# Patient Record
Sex: MALE | Race: WHITE | NOT HISPANIC OR LATINO | URBAN - METROPOLITAN AREA
[De-identification: names, ages, dates, MRNs, and addresses within clinical notes are randomized per-mention and may not be internally consistent; named-entity substitution may affect disease eponyms.]

---

## 2023-04-13 ENCOUNTER — OFFICE VISIT (OUTPATIENT)
Dept: URBAN - METROPOLITAN AREA CLINIC 63 | Facility: CLINIC | Age: 38
End: 2023-04-13
Payer: COMMERCIAL

## 2023-04-13 ENCOUNTER — LAB OUTSIDE AN ENCOUNTER (OUTPATIENT)
Dept: URBAN - METROPOLITAN AREA CLINIC 63 | Facility: CLINIC | Age: 38
End: 2023-04-13

## 2023-04-13 ENCOUNTER — WEB ENCOUNTER (OUTPATIENT)
Dept: URBAN - METROPOLITAN AREA CLINIC 63 | Facility: CLINIC | Age: 38
End: 2023-04-13

## 2023-04-13 VITALS
DIASTOLIC BLOOD PRESSURE: 70 MMHG | TEMPERATURE: 98.1 F | SYSTOLIC BLOOD PRESSURE: 110 MMHG | HEIGHT: 73 IN | OXYGEN SATURATION: 99 % | HEART RATE: 71 BPM | BODY MASS INDEX: 20.89 KG/M2 | WEIGHT: 157.6 LBS

## 2023-04-13 DIAGNOSIS — R68.81 EARLY SATIETY: ICD-10-CM

## 2023-04-13 DIAGNOSIS — R10.13 DYSPEPSIA: ICD-10-CM

## 2023-04-13 DIAGNOSIS — R19.5 DARK STOOLS: ICD-10-CM

## 2023-04-13 PROCEDURE — 99204 OFFICE O/P NEW MOD 45 MIN: CPT | Performed by: PHYSICIAN ASSISTANT

## 2023-04-13 RX ORDER — RIMEGEPANT SULFATE 75 MG/75MG
TABLET, ORALLY DISINTEGRATING ORAL
Qty: 16 EACH | Status: ACTIVE | COMMUNITY

## 2023-04-13 RX ORDER — PROPRANOLOL HYDROCHLORIDE 20 MG/1
TABLET ORAL
Qty: 180 TABLET | Status: ACTIVE | COMMUNITY

## 2023-04-13 NOTE — HPI-TODAY'S VISIT:
37-year-old male presents to the office for follow-up after having been seen in the ER on March 31, 2023.  He complained of abdominal pain which had been going on since November 2022.  He also complained of a visual disturbance and mild right-sided headache which lasted for about 30 minutes and resolved when he arrived at the emergency department.  He reported a history of migraine headaches.  He uses Goody's powder to manage his headaches but limited its use due to his stomach issues and abdominal pain.  Since November, he described experiencing crampy migratory abdominal pain which would be sharp and severe at times.  His CBC, CMP, and lipase were unremarkable.  CT scan of the abdomen and pelvis was done with contrast which demonstrated no acute intra-abdominal process.  Mild fatty infiltration of the liver was noted.  Noncontrast CT scan of the head showed no acute abnormality.    He followed up with neurology on April 6, 2023.  His visual disturbance and headache was thought to be secondary to migraine headache with aura an MRI of the brain was ordered.  He was started on Inderal and Nurtec.  He was advised to cut down on Goody powder which she was using 3-4 times per week.  He reports that in Nov, he began to have early satiety. Usually he is hungry every 2-3 hours but then started feeilng full quickly. He had some recent constipation but had no change in his diet or lifestyle. He started taking a probiotic but his consitpation worsened. He usually has 1-2 BMs per day but then about 2 months ago, he was passing only a few small, hard pellets per day. He was taking Goody's powder once on most days, sometimes twice per day for about 2 years. He decided to stop taking this to see if his Gi symptoms improved. He ended up taking a packet of Goody's powder and then he had a BM that was "on petr side of brown." He stopped taking the goody powder and then the color of his stool returned to normal. He was having dyspepsia occasional on Goody's powder and his dyspepsia resolved.   He has been feeing fine from a GI standpoint since he stopped taking Goody's powder thought his appetite is still low and he has early satiety. No nausea or vomiting. He reports an unintentional weight loss of 10 pounds over the past couple of months. As he goes on talking, he reports intermittent tightness in the epigastrium.  No priop EGD.   He had a colonsocopy in 2018 with [facility]  to evaluate rectal bleeding and he was told this was due to hemorrhoids. His colonoscopy was otherwise normal.  Maternal grandfather had colon cancer. One second cousin had stomach cancer.

## 2023-05-16 ENCOUNTER — OFFICE VISIT (OUTPATIENT)
Dept: URBAN - METROPOLITAN AREA SURGERY CENTER 4 | Facility: SURGERY CENTER | Age: 38
End: 2023-05-16
Payer: COMMERCIAL

## 2023-05-16 ENCOUNTER — CLAIMS CREATED FROM THE CLAIM WINDOW (OUTPATIENT)
Dept: URBAN - METROPOLITAN AREA CLINIC 4 | Facility: CLINIC | Age: 38
End: 2023-05-16
Payer: COMMERCIAL

## 2023-05-16 DIAGNOSIS — K29.70 GASTRITIS: ICD-10-CM

## 2023-05-16 DIAGNOSIS — K21.9 GASTRO-ESOPHAGEAL REFLUX DISEASE WITHOUT ESOPHAGITIS: ICD-10-CM

## 2023-05-16 DIAGNOSIS — K31.89 OTHER DISEASES OF STOMACH AND DUODENUM: ICD-10-CM

## 2023-05-16 DIAGNOSIS — K31.A0 GASTRIC INTESTINAL METAPLASIA, UNSPECIFIED: ICD-10-CM

## 2023-05-16 DIAGNOSIS — K29.70 GASTRITIS, UNSPECIFIED, WITHOUT BLEEDING: ICD-10-CM

## 2023-05-16 DIAGNOSIS — R10.10 UPPER ABDOMINAL PAIN: ICD-10-CM

## 2023-05-16 DIAGNOSIS — K22.89 OTHER SPECIFIED DISEASE OF ESOPHAGUS: ICD-10-CM

## 2023-05-16 PROCEDURE — 88312 SPECIAL STAINS GROUP 1: CPT | Performed by: PATHOLOGY

## 2023-05-16 PROCEDURE — 88313 SPECIAL STAINS GROUP 2: CPT | Performed by: PATHOLOGY

## 2023-05-16 PROCEDURE — 43239 EGD BIOPSY SINGLE/MULTIPLE: CPT | Performed by: INTERNAL MEDICINE

## 2023-05-16 PROCEDURE — 88305 TISSUE EXAM BY PATHOLOGIST: CPT | Performed by: PATHOLOGY

## 2023-05-16 RX ORDER — PROPRANOLOL HYDROCHLORIDE 20 MG/1
TABLET ORAL
Qty: 180 TABLET | Status: ACTIVE | COMMUNITY

## 2023-05-16 RX ORDER — RIMEGEPANT SULFATE 75 MG/75MG
TABLET, ORALLY DISINTEGRATING ORAL
Qty: 16 EACH | Status: ACTIVE | COMMUNITY

## 2023-06-13 ENCOUNTER — OFFICE VISIT (OUTPATIENT)
Dept: URBAN - METROPOLITAN AREA CLINIC 63 | Facility: CLINIC | Age: 38
End: 2023-06-13
Payer: COMMERCIAL

## 2023-06-13 VITALS
WEIGHT: 161 LBS | DIASTOLIC BLOOD PRESSURE: 60 MMHG | TEMPERATURE: 98 F | HEART RATE: 78 BPM | OXYGEN SATURATION: 99 % | BODY MASS INDEX: 21.34 KG/M2 | HEIGHT: 73 IN | SYSTOLIC BLOOD PRESSURE: 110 MMHG

## 2023-06-13 DIAGNOSIS — Q39.8 GASTRIC INLET PATCH OF ESOPHAGUS: ICD-10-CM

## 2023-06-13 DIAGNOSIS — K59.01 SLOW TRANSIT CONSTIPATION: ICD-10-CM

## 2023-06-13 DIAGNOSIS — K29.50 CHRONIC GASTRITIS WITHOUT BLEEDING, UNSPECIFIED GASTRITIS TYPE: ICD-10-CM

## 2023-06-13 DIAGNOSIS — K21.9 CHRONIC GERD: ICD-10-CM

## 2023-06-13 PROBLEM — 235595009: Status: ACTIVE | Noted: 2023-06-13

## 2023-06-13 PROBLEM — 35298007: Status: ACTIVE | Noted: 2023-06-13

## 2023-06-13 PROBLEM — 8493009: Status: ACTIVE | Noted: 2023-06-13

## 2023-06-13 PROBLEM — 37657006: Status: ACTIVE | Noted: 2023-06-13

## 2023-06-13 PROCEDURE — 99214 OFFICE O/P EST MOD 30 MIN: CPT | Performed by: PHYSICIAN ASSISTANT

## 2023-06-13 RX ORDER — PROPRANOLOL HYDROCHLORIDE 20 MG/1
TABLET ORAL
Qty: 180 TABLET | Status: ACTIVE | COMMUNITY

## 2023-06-13 RX ORDER — RIMEGEPANT SULFATE 75 MG/75MG
TABLET, ORALLY DISINTEGRATING ORAL
Qty: 16 EACH | Status: ACTIVE | COMMUNITY

## 2023-06-13 NOTE — HPI-TODAY'S VISIT:
37-year-old male presented to the office in April 2023 after having been seen in the ER in March with complaints of abdominal pain which had been going on since November 2022.  He had been using Goody's powder to manage headaches.  He reported multifocal sharp abdominal pain.  His labs including CBC, CMP and lipase were unremarkable.  CT scan of the abdomen and pelvis was done with contrast which demonstrated no acute intra-abdominal process.  Mild fatty infiltration of the liver was noted. When seen in the office in April he reported early satiety and recent constipation.  He had started a probiotic but his constipation worsened.  He was having 1-2 bowel movements per day but his stools were very small and he described them as hard pellets.  He reported having had a bowel movement that was "on the black or side of brown."  After this occurred he stopped taking Goody powder and the color of his stool returned to normal. He has a family history of colon cancer in his maternal grandfather.  1 second cousin had stomach cancer. EGD was done on May 16, 2023.  His EGD demonstrated a single small nodule in the proximal esophagus, 25 cm from the incisors which was removed.  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  Lower esophageal biopsy showed reflux type changes and gastric mucosa with histological changes suggestive of treated H. pylori gastritis.  There were esophageal mucosal changes suspicious for short segment Carrion's esophagus in the distal esophagus.  Mild inflammation was found in the gastric antrum and in the prepyloric region of the stomach.  Gastric antral biopsy showed nonspecific chronic gastritis.  No evidence of H. pylori.  The duodenum appeared normal with unremarkable biopsy. He follows up doing well.  He had no problems following his procedure. He has no GI complaints today. Bowel habits have been regular. He no longer has any bloating.

## 2023-07-17 ENCOUNTER — DASHBOARD ENCOUNTERS (OUTPATIENT)
Age: 38
End: 2023-07-17

## 2023-07-18 ENCOUNTER — OFFICE VISIT (OUTPATIENT)
Dept: URBAN - METROPOLITAN AREA CLINIC 63 | Facility: CLINIC | Age: 38
End: 2023-07-18

## 2023-07-18 RX ORDER — PROPRANOLOL HYDROCHLORIDE 20 MG/1
TABLET ORAL
Qty: 180 TABLET | Status: ACTIVE | COMMUNITY

## 2023-07-18 RX ORDER — RIMEGEPANT SULFATE 75 MG/75MG
TABLET, ORALLY DISINTEGRATING ORAL
Qty: 16 EACH | Status: ACTIVE | COMMUNITY